# Patient Record
Sex: FEMALE | Race: WHITE | NOT HISPANIC OR LATINO | URBAN - METROPOLITAN AREA
[De-identification: names, ages, dates, MRNs, and addresses within clinical notes are randomized per-mention and may not be internally consistent; named-entity substitution may affect disease eponyms.]

---

## 2023-11-25 ENCOUNTER — EMERGENCY (EMERGENCY)
Facility: HOSPITAL | Age: 68
LOS: 0 days | Discharge: HOME | End: 2023-11-25
Attending: EMERGENCY MEDICINE
Payer: MEDICARE

## 2023-11-25 VITALS
OXYGEN SATURATION: 97 % | HEART RATE: 70 BPM | WEIGHT: 139.99 LBS | RESPIRATION RATE: 17 BRPM | TEMPERATURE: 97 F | DIASTOLIC BLOOD PRESSURE: 82 MMHG | SYSTOLIC BLOOD PRESSURE: 149 MMHG

## 2023-11-25 VITALS
OXYGEN SATURATION: 97 % | TEMPERATURE: 97 F | RESPIRATION RATE: 17 BRPM | DIASTOLIC BLOOD PRESSURE: 69 MMHG | SYSTOLIC BLOOD PRESSURE: 135 MMHG | HEART RATE: 67 BPM

## 2023-11-25 DIAGNOSIS — R05.9 COUGH, UNSPECIFIED: ICD-10-CM

## 2023-11-25 DIAGNOSIS — B33.8 OTHER SPECIFIED VIRAL DISEASES: ICD-10-CM

## 2023-11-25 DIAGNOSIS — R09.81 NASAL CONGESTION: ICD-10-CM

## 2023-11-25 DIAGNOSIS — E03.9 HYPOTHYROIDISM, UNSPECIFIED: ICD-10-CM

## 2023-11-25 DIAGNOSIS — Z88.0 ALLERGY STATUS TO PENICILLIN: ICD-10-CM

## 2023-11-25 DIAGNOSIS — E78.5 HYPERLIPIDEMIA, UNSPECIFIED: ICD-10-CM

## 2023-11-25 DIAGNOSIS — Z87.19 PERSONAL HISTORY OF OTHER DISEASES OF THE DIGESTIVE SYSTEM: ICD-10-CM

## 2023-11-25 DIAGNOSIS — Z20.822 CONTACT WITH AND (SUSPECTED) EXPOSURE TO COVID-19: ICD-10-CM

## 2023-11-25 DIAGNOSIS — Z86.16 PERSONAL HISTORY OF COVID-19: ICD-10-CM

## 2023-11-25 LAB
FLUAV AG NPH QL: SIGNIFICANT CHANGE UP
FLUAV AG NPH QL: SIGNIFICANT CHANGE UP
FLUBV AG NPH QL: SIGNIFICANT CHANGE UP
FLUBV AG NPH QL: SIGNIFICANT CHANGE UP
RSV RNA NPH QL NAA+NON-PROBE: DETECTED
RSV RNA NPH QL NAA+NON-PROBE: DETECTED
SARS-COV-2 RNA SPEC QL NAA+PROBE: SIGNIFICANT CHANGE UP
SARS-COV-2 RNA SPEC QL NAA+PROBE: SIGNIFICANT CHANGE UP

## 2023-11-25 PROCEDURE — 0241U: CPT

## 2023-11-25 PROCEDURE — 99284 EMERGENCY DEPT VISIT MOD MDM: CPT

## 2023-11-25 PROCEDURE — 71046 X-RAY EXAM CHEST 2 VIEWS: CPT | Mod: 26

## 2023-11-25 PROCEDURE — 99283 EMERGENCY DEPT VISIT LOW MDM: CPT | Mod: 25

## 2023-11-25 PROCEDURE — 71046 X-RAY EXAM CHEST 2 VIEWS: CPT

## 2023-11-25 NOTE — ED PROVIDER NOTE - CLINICAL SUMMARY MEDICAL DECISION MAKING FREE TEXT BOX
Patient with cough, lungs clear, no hypoxia, has RSV, will discharge supportive care and PMD follow-up.  Patient counseled regarding conditions which should prompt return.

## 2023-11-25 NOTE — ED PROVIDER NOTE - OBJECTIVE STATEMENT
68 year old female with pmhx of hld, hypothyroid, and gerd presents to ed with cough and congestion x 4 days. Pts  with similar symptoms. no fever, chills, chest pain or sob. recently had covid 2 weeks ago but tested negative.

## 2023-11-25 NOTE — ED PROVIDER NOTE - PATIENT PORTAL LINK FT
You can access the FollowMyHealth Patient Portal offered by [facility] by registering at the following website: [email or web address]. By joining [employer]’s FollowMyHealth portal, you will also be able to view your health information using other applications (apps) compatible with our system.

## 2023-11-25 NOTE — ED PROVIDER NOTE - NS ED ATTENDING STATEMENT MOD
This was a shared visit with the M[facility]. I reviewed and verified the documentation and independently performed the documented:

## 2023-11-25 NOTE — ED ADULT NURSE NOTE - NSFALLUNIVINTERV_ED_ALL_ED
Bed/Stretcher in lowest position, wheels locked, appropriate side rails in place/Call bell, personal items and telephone in reach/Instruct patient to call for assistance before getting out of bed/chair/stretcher/Non-slip footwear applied when patient is off stretcher/[place] to call system/Physically safe environment - no spills, clutter or unnecessary equipment/Purposeful proactive rounding/Room/bathroom lighting operational, light cord in reach

## 2023-11-25 NOTE — ED PROVIDER NOTE - NSFOLLOWUPINSTRUCTIONS_ED_ALL_ED_FT
Respiratory Syncytial Virus Infection, Adult    Respiratory syncytial virus (RSV) infection is an infection caused by RSV, a common virus. This virus is similar to viruses that cause the common cold and the flu. RSV infection can affect the nose, throat, windpipe, and lungs (respiratory system). When the infection is severe, it can cause:  Bronchiolitis. This condition causes inflammation of the air passages in the lungs (bronchioles).  Pneumonia. This condition causes inflammation of the air sacs in the lungs.  RSV infection spreads from person to person (is contagious) through droplets from coughs and sneezes (respiratory secretions). This condition is rarely serious when it occurs in adults.    What are the causes?  This condition is caused by contact with RSV. This can happen by:  Breathing respiratory secretions from someone who has the infection.  Touching something that has been exposed to the virus (is contaminated) and then touching your mouth, nose, or eyes.  Coming in close contact with someone who has this infection. This may happen if you:  Hug or kiss.  Shake or hold hands.  Eat or drink using the same dishes or utensils.  What increases the risk?  The following factors may make you more likely to develop this condition:  Being 65 years of age or older.  Having certain health conditions, including:  A long-term (chronic) lung condition, such as chronic obstructive pulmonary disease (COPD).  An immune system that is weak. This is your body's defense system.  Down syndrome.  Heart disease.  Working in a hospital or other health care facility.  Living in a long-term health care facility.  RSV infections are most common from the months of November to April, but they can happen any time of year.    What are the signs or symptoms?  Symptoms of this condition include:  Having a runny nose.  Coughing. You may have a cough that brings up mucus (productive cough).  Sneezing.  Having a fever.  Wanting to eat less than usual.  Breathing loudly (wheezing).  Having shortness of breath.  Having fluid build up in the lungs (respiratory distress).  How is this diagnosed?  This condition may be diagnosed based on:  Your symptoms.  Your medical history.  A physical exam.  A chest X-ray to rule out pneumonia.  Blood tests or tests of mucus from your lungs (sputum). These tests may be done for older adults.  A test of a sample of your respiratory secretions.  How is this treated?  In most cases, the RSV infection will go away after 1–2 weeks of caring for yourself at home.    Sometimes, RSV infection is severe and can cause bronchiolitis or pneumonia. If you develop one or both of these conditions, you may need to be treated in the hospital. You may be given:  Oxygen therapy.  Antiviral medicine.  Medicines to open your bronchioles (bronchodilators).  Follow these instructions at home:  Medicines    Take over-the-counter and prescription medicines only as told by your health care provider.  If you were prescribed an antiviral medicine, take it as told by your health care provider. Do not stop using the antiviral even if you start to feel better.  Lifestyle      Eat a healthy diet.  Do not drink alcohol.  Do not use any products that contain nicotine or tobacco, such as cigarettes, e-cigarettes, and chewing tobacco. If you need help quitting, ask your health care provider.  Rest at home until your symptoms go away.  Return to your normal activities as told by your health care provider. Ask your health care provider what activities are safe for you.  General instructions      Drink enough fluid to keep your urine pale yellow.  Gargle with a salt–water mixture 3–4 times a day or as needed. To make a salt–water mixture, completely dissolve ½–1 tsp (3–6 g) of salt in 1 cup (237 mL) of warm water.  Keep all follow-up visits as told by your health care provider. This is important.  How is this prevented?    To prevent catching and spreading RSV:  Wash your hands often with soap and water for at least 20 seconds. If soap and water are not available, use hand . Do not touch your face without first cleaning your hands.  Stay home if you have symptoms of the common cold or the flu.  Cover your nose and mouth when you cough or sneeze.  Avoid large groups of people.  Keep a safe distance of about 6 feet (1.8 m) from people who are coughing or sneezing.  Where to find more information  Centers for Disease Control and Prevention: www.cdc.gov  Contact a health care provider if:  Your symptoms get worse or have not changed after 2 weeks.  You have:  A fever.  Hot flashes, sweating, or chills that keep happening.  A cough that brings up much more mucus than usual.  A cough that brings up blood.  You feel:  Very tired (lethargic).  Confused.  Get help right away if:  You have increased or severe trouble breathing.  You lose consciousness.  These symptoms may represent a serious problem that is an emergency. Do not wait to see if the symptoms will go away. Get medical help right away. Call your local emergency services (911 in the [place] Do not drive yourself to the hospital.    Summary  Respiratory syncytial virus (RSV) infection is an infection caused by RSV, a common virus. RSV infection can affect the nose, throat, windpipe, and lungs (respiratory system).  When the infection is severe, it can cause bronchiolitis or pneumonia.  Take over-the-counter and prescription medicines only as told by your health care provider.  Contact a health care provider if your symptoms get worse or have not changed after 2 weeks.  This information is not intended to replace advice given to you by your health care provider. Make sure you discuss any questions you have with your health care provider.

## 2024-11-08 ENCOUNTER — EMERGENCY (EMERGENCY)
Facility: HOSPITAL | Age: 69
LOS: 0 days | Discharge: HOME | End: 2024-11-08
Attending: EMERGENCY MEDICINE

## 2024-11-08 VITALS
OXYGEN SATURATION: 97 % | TEMPERATURE: 98 F | WEIGHT: 138.89 LBS | HEART RATE: 70 BPM | DIASTOLIC BLOOD PRESSURE: 98 MMHG | SYSTOLIC BLOOD PRESSURE: 164 MMHG | RESPIRATION RATE: 19 BRPM

## 2024-11-08 PROCEDURE — 99282 EMERGENCY DEPT VISIT SF MDM: CPT

## 2024-11-08 PROCEDURE — 99283 EMERGENCY DEPT VISIT LOW MDM: CPT

## 2024-11-08 NOTE — ED PROVIDER NOTE - NSFOLLOWUPINSTRUCTIONS_ED_ALL_ED_FT
Our Emergency Department Referral Coordinators will be reaching out to you in the next 24-48 hours from 9:00am to 5:00pm to schedule a follow up appointment. Please expect a phone call from the hospital in that time frame. If you do not receive a call or if you have any questions or concerns, you can reach them at   [phone]      Rash    A rash is a change in the color of the skin. A rash can also change the way your skin feels. There are many different conditions and factors that can cause a rash, most of which are not dangerous. Make sure to follow up with your primary care physician or a dermatologist as instructed by your health care provider.    SEEK IMMEDIATE MEDICAL CARE IF YOU HAVE ANY OF THE FOLLOWING SYMPTOMS: fever, blisters, a rash inside your mouth, vaginal or anal pain, or altered mental status.

## 2024-11-08 NOTE — ED PROVIDER NOTE - OBJECTIVE STATEMENT
68 y/o female presents to the Ed with pruritic rash to trunk x 3 days. patient recently on po zithromax 10/24. patient denies any difficulty swallowing. no vomiting. patient took benadryl this am. patient eval by urgent care and rx prednisone which patient has not started.

## 2024-11-08 NOTE — ED PROVIDER NOTE - PHYSICAL EXAMINATION
Vital Signs: I have reviewed the initial vital signs.  Constitutional: well-nourished, appears stated age, no acute distress  Head: atraumatic and normocephalic  Eyes:PERRLA, EOMI,, clear conjunctiva  ENT:  oropharynx clear,  MMM  Cardiovascular: regular rate, regular rhythm, well-perfused extremities  Respiratory: unlabored respiratory effort, clear to auscultation bilaterally  skin: urticarial rash to trunk and neck, no excoriations  Neuro: awake, alert, follows commands, oriented, no focal deficits, GCS 15  ;

## 2024-11-08 NOTE — ED ADULT NURSE NOTE - NSFALLUNIVINTERV_ED_ALL_ED
Bed/Stretcher in lowest position, wheels locked, appropriate side rails in place/Call bell, personal items and telephone in reach/Instruct patient to call for assistance before getting out of bed/chair/stretcher/Non-slip footwear applied when patient is off stretcher/[place] to call system/Physically safe environment - no spills, clutter or unnecessary equipment/Purposeful proactive rounding/Room/bathroom lighting operational, light cord in reach

## 2024-11-08 NOTE — ED ADULT NURSE NOTE - CHIEF COMPLAINT QUOTE
c/o rash started 3 days ago. Pt went to [facility] yesterday. Pt had taken Benadryl at 9:15 am. Pt finished z pack 10/29.

## 2024-11-08 NOTE — ED ADULT TRIAGE NOTE - CHIEF COMPLAINT QUOTE
c/o rash started 3 days ago. Pt went to [facility] yesterday. Pt had taken Benadryl at 9:15 am. Pt finished z pack 10/29.

## 2024-11-08 NOTE — ED PROVIDER NOTE - CLINICAL SUMMARY MEDICAL DECISION MAKING FREE TEXT BOX
69yoF with a few days of rash. recently finished azithro. minaly on trunk. itchy. denies swelling to  mouth, tongue, or throat, chest pain, shortness of breath, wheezing, cough, acutely decreased ability to exercise, nausea, vomiting, diarrhea, dizziness, weakness, numbness, or fevers. also with recent detergent change. on exam, nontoxic, vs noted   Gen: Alert, NAD  Skin: Warm, dry, intact, faint urticarial rash to trunk. no vesicles, crosses midline.   Head: NCAT  ENT: Mucous membranes moist, OP w/o exudates or erythema, swelling, masses or bulging   Neck: Supple  CV: RRR, normal S1, S2, no m/r/g  Resp: Non labored respirations, Lungs CTA b/l, no wheezes, rales, rhonchi  Abdomen: Soft, nondistended, nontender  Extremities: Moving all extremities, no edema  Neuro: No focal neuro deficits  Psych: Cooperative    rash. appears atopic. possible delayed allergic reaction to abx vs dermatitis. advised to contiue to antihistamines prn and take prednisone that UC rx. refer derm. In my opinion, based on current evaluation and results, an acute medical or surgical emergency does not appear to be occurring at this time and I feel that the pt is stable for further outpatient work up and/or treatment. Return precautions discussed.

## 2024-11-08 NOTE — ED PROVIDER NOTE - PATIENT PORTAL LINK FT
You can access the FollowMyHealth Patient Portal offered by [facility] by registering at the following website: [email or web address]. By joining [employer]’s FollowMyHealth portal, you will also be able to view your health information using other applications (apps) compatible with our system.